# Patient Record
Sex: FEMALE | Race: WHITE | NOT HISPANIC OR LATINO | Employment: FULL TIME | URBAN - METROPOLITAN AREA
[De-identification: names, ages, dates, MRNs, and addresses within clinical notes are randomized per-mention and may not be internally consistent; named-entity substitution may affect disease eponyms.]

---

## 2019-12-25 ENCOUNTER — HOSPITAL ENCOUNTER (EMERGENCY)
Facility: MEDICAL CENTER | Age: 49
End: 2019-12-25
Attending: EMERGENCY MEDICINE
Payer: COMMERCIAL

## 2019-12-25 VITALS
BODY MASS INDEX: 24.86 KG/M2 | TEMPERATURE: 97.9 F | RESPIRATION RATE: 18 BRPM | OXYGEN SATURATION: 95 % | HEART RATE: 74 BPM | HEIGHT: 68 IN | SYSTOLIC BLOOD PRESSURE: 143 MMHG | WEIGHT: 164.02 LBS | DIASTOLIC BLOOD PRESSURE: 108 MMHG

## 2019-12-25 DIAGNOSIS — R51.9 OCCIPITAL HEADACHE: ICD-10-CM

## 2019-12-25 PROCEDURE — 99284 EMERGENCY DEPT VISIT MOD MDM: CPT

## 2019-12-25 PROCEDURE — 700102 HCHG RX REV CODE 250 W/ 637 OVERRIDE(OP): Performed by: EMERGENCY MEDICINE

## 2019-12-25 PROCEDURE — 700111 HCHG RX REV CODE 636 W/ 250 OVERRIDE (IP): Performed by: EMERGENCY MEDICINE

## 2019-12-25 PROCEDURE — 64450 NJX AA&/STRD OTHER PN/BRANCH: CPT

## 2019-12-25 PROCEDURE — A9270 NON-COVERED ITEM OR SERVICE: HCPCS | Performed by: EMERGENCY MEDICINE

## 2019-12-25 RX ORDER — CYCLOBENZAPRINE HCL 10 MG
10 TABLET ORAL 3 TIMES DAILY PRN
Qty: 30 TAB | Refills: 0 | Status: SHIPPED | OUTPATIENT
Start: 2019-12-25 | End: 2021-11-22

## 2019-12-25 RX ORDER — DIAZEPAM 2 MG/1
2 TABLET ORAL ONCE
Status: COMPLETED | OUTPATIENT
Start: 2019-12-25 | End: 2019-12-25

## 2019-12-25 RX ORDER — BUPIVACAINE HYDROCHLORIDE 2.5 MG/ML
10 INJECTION, SOLUTION EPIDURAL; INFILTRATION; INTRACAUDAL ONCE
Status: COMPLETED | OUTPATIENT
Start: 2019-12-25 | End: 2019-12-25

## 2019-12-25 RX ORDER — DEXAMETHASONE 4 MG/1
8 TABLET ORAL ONCE
Status: COMPLETED | OUTPATIENT
Start: 2019-12-25 | End: 2019-12-25

## 2019-12-25 RX ADMIN — DIAZEPAM 2 MG: 2 TABLET ORAL at 20:08

## 2019-12-25 RX ADMIN — BUPIVACAINE HYDROCHLORIDE 10 ML: 2.5 INJECTION, SOLUTION EPIDURAL; INFILTRATION; INTRACAUDAL; PERINEURAL at 19:40

## 2019-12-25 RX ADMIN — DEXAMETHASONE 8 MG: 4 TABLET ORAL at 19:43

## 2019-12-25 ASSESSMENT — LIFESTYLE VARIABLES: DO YOU DRINK ALCOHOL: NO

## 2019-12-26 NOTE — ED NOTES
Pt reports having numbness tingling to hands and feet with anxiety, anti anxiety medication given see eMAR.  Will reassess until symptoms resolve and plan for dispo. Will CTM.

## 2019-12-26 NOTE — ED NOTES
Pt medicated as ordered, received bupivicaine injection by ERP, tolerated well. Report to Isabelle BURT, relinquished care of this patient.

## 2019-12-26 NOTE — ED PROVIDER NOTES
ED Provider Note    CHIEF COMPLAINT  Chief Complaint   Patient presents with   • Neck Pain     Bilateral Neck painx 3 days. Denies trauma. Full ROM   • Headache     x4 days       HPI  Luil Nielsen is a 49 y.o. female who presents for evaluation of several days of spontaneous neck tightness and pain.  Patient denies any trauma she has no significant medical history other than mild hypertension.  She denies any direct injury no whiplash type injury she does not recall sleeping oddly.  She reports tightness over the mastoid process bilaterally symmetric.  No numbness weakness or tingling.  Pain is worse with range of motion it causes a posterior headache as well.  She denies high fevers or anterior neck pain.  She has no history of thyroid disease.  No other symptoms reported    REVIEW OF SYSTEMS  See HPI for further details.  No high fevers numbness weakness or tingling all other systems are negative.     PAST MEDICAL HISTORY  Past Medical History:   Diagnosis Date   • Hypertension        FAMILY HISTORY  Noncontributory    SOCIAL HISTORY  Social History     Socioeconomic History   • Marital status:      Spouse name: Not on file   • Number of children: Not on file   • Years of education: Not on file   • Highest education level: Not on file   Occupational History   • Not on file   Social Needs   • Financial resource strain: Not on file   • Food insecurity:     Worry: Not on file     Inability: Not on file   • Transportation needs:     Medical: Not on file     Non-medical: Not on file   Tobacco Use   • Smoking status: Current Some Day Smoker     Packs/day: 1.00   • Smokeless tobacco: Never Used   Substance and Sexual Activity   • Alcohol use: Yes     Comment: 2/day   • Drug use: No   • Sexual activity: Not on file   Lifestyle   • Physical activity:     Days per week: Not on file     Minutes per session: Not on file   • Stress: Not on file   Relationships   • Social connections:     Talks on phone: Not on  "file     Gets together: Not on file     Attends Anabaptism service: Not on file     Active member of club or organization: Not on file     Attends meetings of clubs or organizations: Not on file     Relationship status: Not on file   • Intimate partner violence:     Fear of current or ex partner: Not on file     Emotionally abused: Not on file     Physically abused: Not on file     Forced sexual activity: Not on file   Other Topics Concern   • Not on file   Social History Narrative   • Not on file       SURGICAL HISTORY  No past surgical history on file.    CURRENT MEDICATIONS  Home Medications     Reviewed by Jacinto Kimball R.N. (Registered Nurse) on 12/25/19 at 1903  Med List Status: Not Addressed   Medication Last Dose Status   ibuprofen (MOTRIN) 200 MG TABS  Active   ibuprofen (MOTRIN) 800 MG TABS  Active                ALLERGIES  No Known Allergies    PHYSICAL EXAM  VITAL SIGNS: /114   Pulse (!) 114   Temp 36.6 °C (97.9 °F)   Resp 18   Ht 1.727 m (5' 8\")   Wt 74.4 kg (164 lb 0.4 oz)   SpO2 97%   BMI 24.94 kg/m²       Constitutional: Patient appears to be uncomfortable  HENT: Normocephalic, Atraumatic, Bilateral external ears normal, Oropharynx moist, No oral exudates, Nose normal.   Eyes: PERRLA, EOMI, Conjunctiva normal, No discharge.   Neck: No midline bony cervical tenderness there is significant paraspinal spasm laterally, symmetric no overlying skin changes   cardiovascular: Normal heart rate, Normal rhythm, No murmurs, No rubs, No gallops.   Thorax & Lungs: Normal breath sounds, No respiratory distress, No wheezing, No chest tenderness.   Abdomen: Bowel sounds normal, Soft, No tenderness, No masses, No pulsatile masses.   Skin: Warm, Dry, No erythema, No rash.   Back: No tenderness, No CVA tenderness.   Extremities: Intact distal pulses, No edema, No tenderness, No cyanosis, No clubbing.   Musculoskeletal: Good range of motion in all major joints. No tenderness to palpation or major " deformities noted.   Neurologic: Alert & oriented x 3, Normal motor function, Normal sensory function, No focal deficits noted.   Psychiatric: Anxious      COURSE & MEDICAL DECISION MAKING  Pertinent Labs & Imaging studies reviewed. (See chart for details)  Physician procedure: Occipital lesser nerve block.  Verbal consent was obtained, the posterior neck was prepped 3 times with chlorhexidine.  A total of 5 cc bupivacaine without epinephrine was instilled on the right side a subcutaneous wheal was performed and then deeper injections were performed.  Similar injection pattern was performed on the contralateral other side with an additional 5 cc.  No complications.  Patient experienced moderate relief    Patient presents here with posterior neck pain.  There is no fever no frontal headache.  This is most consistent with likely occipital tension headache.  She was given dose of steroids.  I will send her home on Flexeril    FINAL IMPRESSION  1.  Occipital headache    Electronically signed by: Gen Johnston, 12/25/2019 7:40 PM

## 2019-12-26 NOTE — ED TRIAGE NOTES
Patient ambulatory to the ED with no Acute distress noted  Chief Complaint   Patient presents with   • Neck Pain     Bilateral Neck painx 3 days. Denies trauma. Full ROM   • Headache     x4 days     Reports that she started with a HA ~ 4 days, unsure about any trauma. The last 3x days has been having bilateral neck pain with full ROM. No other Neuro S/Sx.    After triage, patient educated on flow of the ED and placement in Room. Informed patient to contact staff if S/Sx get worse. Patient placed in the main waiting room

## 2021-10-23 ENCOUNTER — APPOINTMENT (OUTPATIENT)
Dept: RADIOLOGY | Facility: MEDICAL CENTER | Age: 51
End: 2021-10-23
Attending: EMERGENCY MEDICINE
Payer: COMMERCIAL

## 2021-10-23 ENCOUNTER — HOSPITAL ENCOUNTER (EMERGENCY)
Facility: MEDICAL CENTER | Age: 51
End: 2021-10-23
Attending: EMERGENCY MEDICINE
Payer: COMMERCIAL

## 2021-10-23 VITALS
SYSTOLIC BLOOD PRESSURE: 124 MMHG | HEART RATE: 77 BPM | DIASTOLIC BLOOD PRESSURE: 88 MMHG | BODY MASS INDEX: 26.52 KG/M2 | TEMPERATURE: 96.8 F | OXYGEN SATURATION: 95 % | WEIGHT: 175 LBS | RESPIRATION RATE: 19 BRPM | HEIGHT: 68 IN

## 2021-10-23 DIAGNOSIS — S09.90XA CLOSED HEAD INJURY, INITIAL ENCOUNTER: ICD-10-CM

## 2021-10-23 DIAGNOSIS — S02.2XXA CLOSED FRACTURE OF NASAL BONE, INITIAL ENCOUNTER: ICD-10-CM

## 2021-10-23 PROCEDURE — 70486 CT MAXILLOFACIAL W/O DYE: CPT

## 2021-10-23 PROCEDURE — 99284 EMERGENCY DEPT VISIT MOD MDM: CPT

## 2021-10-23 PROCEDURE — 72125 CT NECK SPINE W/O DYE: CPT

## 2021-10-23 PROCEDURE — 70450 CT HEAD/BRAIN W/O DYE: CPT

## 2021-10-23 RX ORDER — CEPHALEXIN 500 MG/1
500 CAPSULE ORAL 4 TIMES DAILY
Qty: 28 CAPSULE | Refills: 0 | Status: SHIPPED | OUTPATIENT
Start: 2021-10-23 | End: 2021-10-30
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
## 2021-11-22 ENCOUNTER — OFFICE VISIT (OUTPATIENT)
Dept: URGENT CARE | Facility: CLINIC | Age: 51
End: 2021-11-22
Payer: COMMERCIAL

## 2021-11-22 ENCOUNTER — HOSPITAL ENCOUNTER (EMERGENCY)
Facility: MEDICAL CENTER | Age: 51
End: 2021-11-22
Attending: EMERGENCY MEDICINE
Payer: COMMERCIAL

## 2021-11-22 ENCOUNTER — APPOINTMENT (OUTPATIENT)
Dept: RADIOLOGY | Facility: MEDICAL CENTER | Age: 51
End: 2021-11-22
Attending: EMERGENCY MEDICINE
Payer: COMMERCIAL

## 2021-11-22 VITALS
DIASTOLIC BLOOD PRESSURE: 128 MMHG | BODY MASS INDEX: 24.86 KG/M2 | SYSTOLIC BLOOD PRESSURE: 166 MMHG | RESPIRATION RATE: 18 BRPM | OXYGEN SATURATION: 94 % | HEART RATE: 95 BPM | HEIGHT: 68 IN | TEMPERATURE: 96.9 F | WEIGHT: 164 LBS

## 2021-11-22 VITALS
RESPIRATION RATE: 18 BRPM | SYSTOLIC BLOOD PRESSURE: 168 MMHG | WEIGHT: 155.42 LBS | HEIGHT: 68 IN | TEMPERATURE: 98.4 F | OXYGEN SATURATION: 97 % | BODY MASS INDEX: 23.56 KG/M2 | DIASTOLIC BLOOD PRESSURE: 115 MMHG | HEART RATE: 81 BPM

## 2021-11-22 DIAGNOSIS — I10 ASYMPTOMATIC HYPERTENSION: ICD-10-CM

## 2021-11-22 DIAGNOSIS — R94.31 ABNORMAL ECG: ICD-10-CM

## 2021-11-22 DIAGNOSIS — R53.83 OTHER FATIGUE: ICD-10-CM

## 2021-11-22 DIAGNOSIS — R53.83 FATIGUE, UNSPECIFIED TYPE: ICD-10-CM

## 2021-11-22 LAB
ALBUMIN SERPL BCP-MCNC: 4.6 G/DL (ref 3.2–4.9)
ALBUMIN/GLOB SERPL: 2 G/DL
ALP SERPL-CCNC: 88 U/L (ref 30–99)
ALT SERPL-CCNC: 27 U/L (ref 2–50)
ANION GAP SERPL CALC-SCNC: 10 MMOL/L (ref 7–16)
AST SERPL-CCNC: 23 U/L (ref 12–45)
BASOPHILS # BLD AUTO: 0.5 % (ref 0–1.8)
BASOPHILS # BLD: 0.04 K/UL (ref 0–0.12)
BILIRUB SERPL-MCNC: 0.5 MG/DL (ref 0.1–1.5)
BUN SERPL-MCNC: 8 MG/DL (ref 8–22)
CALCIUM SERPL-MCNC: 9.9 MG/DL (ref 8.5–10.5)
CHLORIDE SERPL-SCNC: 105 MMOL/L (ref 96–112)
CO2 SERPL-SCNC: 23 MMOL/L (ref 20–33)
CREAT SERPL-MCNC: 0.61 MG/DL (ref 0.5–1.4)
EKG IMPRESSION: NORMAL
EOSINOPHIL # BLD AUTO: 0.07 K/UL (ref 0–0.51)
EOSINOPHIL NFR BLD: 1 % (ref 0–6.9)
ERYTHROCYTE [DISTWIDTH] IN BLOOD BY AUTOMATED COUNT: 45 FL (ref 35.9–50)
GLOBULIN SER CALC-MCNC: 2.3 G/DL (ref 1.9–3.5)
GLUCOSE BLD-MCNC: 122 MG/DL (ref 70–100)
GLUCOSE SERPL-MCNC: 117 MG/DL (ref 65–99)
HCT VFR BLD AUTO: 45.2 % (ref 37–47)
HGB BLD-MCNC: 15.8 G/DL (ref 12–16)
IMM GRANULOCYTES # BLD AUTO: 0.02 K/UL (ref 0–0.11)
IMM GRANULOCYTES NFR BLD AUTO: 0.3 % (ref 0–0.9)
LYMPHOCYTES # BLD AUTO: 1.34 K/UL (ref 1–4.8)
LYMPHOCYTES NFR BLD: 18.4 % (ref 22–41)
MCH RBC QN AUTO: 32.8 PG (ref 27–33)
MCHC RBC AUTO-ENTMCNC: 35 G/DL (ref 33.6–35)
MCV RBC AUTO: 94 FL (ref 81.4–97.8)
MONOCYTES # BLD AUTO: 0.46 K/UL (ref 0–0.85)
MONOCYTES NFR BLD AUTO: 6.3 % (ref 0–13.4)
NEUTROPHILS # BLD AUTO: 5.35 K/UL (ref 2–7.15)
NEUTROPHILS NFR BLD: 73.5 % (ref 44–72)
NRBC # BLD AUTO: 0 K/UL
NRBC BLD-RTO: 0 /100 WBC
NT-PROBNP SERPL IA-MCNC: 105 PG/ML (ref 0–125)
PLATELET # BLD AUTO: 285 K/UL (ref 164–446)
PMV BLD AUTO: 9.9 FL (ref 9–12.9)
POTASSIUM SERPL-SCNC: 4.2 MMOL/L (ref 3.6–5.5)
PROT SERPL-MCNC: 6.9 G/DL (ref 6–8.2)
RBC # BLD AUTO: 4.81 M/UL (ref 4.2–5.4)
SODIUM SERPL-SCNC: 138 MMOL/L (ref 135–145)
TROPONIN T SERPL-MCNC: <6 NG/L (ref 6–19)
TSH SERPL DL<=0.005 MIU/L-ACNC: 1.76 UIU/ML (ref 0.38–5.33)
VIT B12 SERPL-MCNC: 680 PG/ML (ref 211–911)
WBC # BLD AUTO: 7.3 K/UL (ref 4.8–10.8)

## 2021-11-22 PROCEDURE — 85025 COMPLETE CBC W/AUTO DIFF WBC: CPT

## 2021-11-22 PROCEDURE — 82607 VITAMIN B-12: CPT

## 2021-11-22 PROCEDURE — 93000 ELECTROCARDIOGRAM COMPLETE: CPT | Performed by: NURSE PRACTITIONER

## 2021-11-22 PROCEDURE — 82962 GLUCOSE BLOOD TEST: CPT | Performed by: NURSE PRACTITIONER

## 2021-11-22 PROCEDURE — 84443 ASSAY THYROID STIM HORMONE: CPT

## 2021-11-22 PROCEDURE — 99204 OFFICE O/P NEW MOD 45 MIN: CPT | Performed by: NURSE PRACTITIONER

## 2021-11-22 PROCEDURE — 80053 COMPREHEN METABOLIC PANEL: CPT

## 2021-11-22 PROCEDURE — 83880 ASSAY OF NATRIURETIC PEPTIDE: CPT

## 2021-11-22 PROCEDURE — 84484 ASSAY OF TROPONIN QUANT: CPT

## 2021-11-22 PROCEDURE — 93005 ELECTROCARDIOGRAM TRACING: CPT

## 2021-11-22 PROCEDURE — 93005 ELECTROCARDIOGRAM TRACING: CPT | Performed by: EMERGENCY MEDICINE

## 2021-11-22 PROCEDURE — 99283 EMERGENCY DEPT VISIT LOW MDM: CPT

## 2021-11-22 PROCEDURE — 71045 X-RAY EXAM CHEST 1 VIEW: CPT

## 2021-11-22 RX ORDER — RAMIPRIL 5 MG/1
5 CAPSULE ORAL DAILY
Qty: 30 CAPSULE | Refills: 11 | Status: SHIPPED | OUTPATIENT
Start: 2021-11-22
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
## 2021-11-22 NOTE — ED TRIAGE NOTES
"Chief Complaint   Patient presents with   • Fatigue     x48 hours   • Nausea   • Sent from Urgent Care     Abnormal EKG     BP (!) 189/132   Pulse 87   Temp 37.2 °C (99 °F) (Temporal)   Resp 14   Ht 1.727 m (5' 8\")   Wt 70.5 kg (155 lb 6.8 oz)   LMP 05/29/2015   SpO2 95%   BMI 23.63 kg/m²     Pt ambulated into triage, mask in place. EKG completed. Denies CP, SOB. NAD, encouraged to return to the triage nurse or tech with any new complaints or symptoms.  "

## 2021-11-22 NOTE — ED PROVIDER NOTES
ED Provider Note    ED Provider Note    Primary care provider: Perla Ness D.O.  Means of arrival: Walk-in  History obtained from: Patient    CHIEF COMPLAINT  Chief Complaint   Patient presents with   • Fatigue     x48 hours   • Nausea   • Sent from Urgent Care     Abnormal EKG     Seen at 1:51 PM.   HPI  Luli Nielsen is a 51 y.o. female who presents to the Emergency Department for fatigue.  The patient was seen in our emergency department 3 weeks ago after having what appears to be a orthostatic syncopal event.  She underwent a head CT.  Since this time she has had increasing fatigue.  She states that she sleeps throughout the night, when she wakes up she still does not feel rested.  She notes complete lack of energy.  She also feels a buzzing sensation throughout her body at times.    She denies any fevers, chills, night sweats, weight loss, headache, visual changes, neck pain, chest pain, cough, shortness of breath, dyspnea on exertion, orthopnea, abdominal pain, nausea, vomiting, diarrhea, dysuria, rash, impaired immunity or history of diabetes.    She does smoke cigarettes and drinks between 2 to 4 glasses of wine every evening.  She is unaware of any history of FARRUKH but does snore per her .  She has not seen a primary care physician the past several years.  She does not take any medications.    REVIEW OF SYSTEMS  See HPI,   Remainder of ROS negative.     PAST MEDICAL HISTORY   has a past medical history of Hypertension.    SURGICAL HISTORY  patient denies any surgical history    SOCIAL HISTORY  Social History     Tobacco Use   • Smoking status: Current Every Day Smoker     Types: Cigarettes   • Smokeless tobacco: Never Used   Vaping Use   • Vaping Use: Never used   Substance Use Topics   • Alcohol use: Yes     Comment: daily - wine   • Drug use: No      Social History     Substance and Sexual Activity   Drug Use No       FAMILY HISTORY  History reviewed. No pertinent family  "history.    CURRENT MEDICATIONS  Reviewed.  See Encounter Summary.     ALLERGIES  No Known Allergies    PHYSICAL EXAM  VITAL SIGNS: BP (!) 168/115   Pulse 81   Temp 36.9 °C (98.4 °F) (Temporal)   Resp 18   Ht 1.727 m (5' 8\")   Wt 70.5 kg (155 lb 6.8 oz)   LMP 05/29/2015   SpO2 97%   BMI 23.63 kg/m²   Constitutional: Awake, alert in no apparent distress.  HENT: Normocephalic, Bilateral external ears normal. Nose normal.   Eyes: Conjunctiva normal, non-icteric, EOMI.    Thorax & Lungs: Easy unlabored respirations, Clear to ascultation bilaterally.  Cardiovascular: Regular rate, Regular rhythm, No murmurs, rubs or gallops. Bilateral pulses symmetrical.   Abdomen:  Soft, nontender, nondistended, normal active bowel sounds.   :    Skin: Visualized skin is  Dry, No erythema, No rash.   Musculoskeletal:   No cyanosis, clubbing or edema. No leg asymmetry.   Neurologic: Alert, Grossly non-focal.   Psychiatric: Normal affect, Normal mood  Lymphatic:  No cervical LAD    EKG   12 lead Interpreted by me  Rhythm:  Normal sinus rhythm   Rate: 70  Axis: normal  Ectopy: none  Conduction: normal  ST Segments: no acute change  T Waves: no acute change  Clinical Impression: Normal EKG without acute changes, direct comparison made to the one from the urgent care earlier today, no significant change.  There was some mild nonspecific T wave flattening on the lateral leads on the earlier EKG.    RADIOLOGY  DX-CHEST-PORTABLE (1 VIEW)   Final Result      1.  There is no acute cardiopulmonary process.            COURSE & MEDICAL DECISION MAKING  Pertinent Labs & Imaging studies reviewed. (See chart for details)    Differential diagnoses include but are not limited to: Hypertensive urgency, CKD, FARRUKH, B12 deficiency, malignancy, hypothyroidism    1:51 PM - Medical record reviewed, in frequent ER visits that the patient was evaluated a month ago for headache, CT head negative.  She was persistently hypertensive on her last visit as " well.    Decision Making:  This is a pleasant 51 y.o. year old female who presents with fatigue for the past several weeks.  Differential as above.  Laboratory evaluation is unremarkable today.  The patient does not have any sign of endorgan dysfunction, she does not have renal insufficiency, B12 levels within normal limits, cell lines do not suggest malignancy, chest x-ray normal, thyroid levels normal as well.    I do not have an explanation for the patient's fatigue but she does drink between 2 and 4 glasses of wine, so this possibly can be causing her fatigue and poor REM sleep, and other consideration would be FARRUKH.  This should be handled through her primary care physician.  I recommend she contact her insurance company to get a list of in network providers in the area.  In the interim I will start her back on her ACE inhibitor.    Discharge Medications:  Discharge Medication List as of 11/22/2021  4:42 PM      START taking these medications    Details   ramipril (ALTACE) 5 MG Cap Take 1 Capsule by mouth every day., Disp-30 Capsule, R-11, Normal             The patient was discharged home (see d/c instructions) was told to return immediately for any signs or symptoms listed, or any worsening at all.  The patient verbally agreed to the discharge precautions and follow-up plan which is documented in EPIC.        FINAL IMPRESSION  1. Asymptomatic hypertension    2. Other fatigue

## 2021-11-23 NOTE — ED NOTES
Pt ready for discharge.  Instructions provided & px called in.  Pt verbalized understanding.  Leaves ER ambulatory, steady gait, no distress.

## 2024-10-10 ENCOUNTER — HOSPITAL ENCOUNTER (OUTPATIENT)
Dept: RADIOLOGY | Facility: MEDICAL CENTER | Age: 54
End: 2024-10-10
Payer: COMMERCIAL

## 2024-10-18 ENCOUNTER — HOSPITAL ENCOUNTER (OUTPATIENT)
Dept: RADIOLOGY | Facility: MEDICAL CENTER | Age: 54
End: 2024-10-18
Attending: FAMILY MEDICINE
Payer: COMMERCIAL

## 2024-10-18 DIAGNOSIS — Z12.31 ENCOUNTER FOR SCREENING MAMMOGRAM FOR MALIGNANT NEOPLASM OF BREAST: ICD-10-CM

## 2024-10-18 PROCEDURE — 77067 SCR MAMMO BI INCL CAD: CPT

## 2024-10-22 ENCOUNTER — HOSPITAL ENCOUNTER (OUTPATIENT)
Dept: RADIOLOGY | Facility: MEDICAL CENTER | Age: 54
End: 2024-10-22
Payer: COMMERCIAL

## 2024-11-21 ENCOUNTER — HOSPITAL ENCOUNTER (OUTPATIENT)
Dept: RADIOLOGY | Facility: MEDICAL CENTER | Age: 54
End: 2024-11-21
Attending: FAMILY MEDICINE
Payer: COMMERCIAL

## 2024-11-21 DIAGNOSIS — R92.8 ABNORMAL MAMMOGRAM: ICD-10-CM

## 2024-11-21 PROCEDURE — 77065 DX MAMMO INCL CAD UNI: CPT | Mod: LT

## 2024-11-25 ENCOUNTER — HOSPITAL ENCOUNTER (OUTPATIENT)
Dept: RADIOLOGY | Facility: MEDICAL CENTER | Age: 54
End: 2024-11-25
Attending: FAMILY MEDICINE
Payer: COMMERCIAL

## 2024-11-25 DIAGNOSIS — R92.8 ABNORMAL FINDINGS ON DIAGNOSTIC IMAGING OF BREAST: ICD-10-CM

## 2024-11-25 LAB — PATHOLOGY CONSULT NOTE: NORMAL

## 2024-11-25 PROCEDURE — 77065 DX MAMMO INCL CAD UNI: CPT | Mod: LT

## 2024-11-25 PROCEDURE — 88305 TISSUE EXAM BY PATHOLOGIST: CPT

## 2024-11-26 ENCOUNTER — TELEPHONE (OUTPATIENT)
Dept: RADIOLOGY | Facility: MEDICAL CENTER | Age: 54
End: 2024-11-26
Payer: COMMERCIAL

## 2024-11-27 ENCOUNTER — TELEPHONE (OUTPATIENT)
Dept: RADIOLOGY | Facility: MEDICAL CENTER | Age: 54
End: 2024-11-27
Payer: COMMERCIAL

## 2025-01-05 ENCOUNTER — PHARMACY VISIT (OUTPATIENT)
Dept: PHARMACY | Facility: MEDICAL CENTER | Age: 55
End: 2025-01-05
Payer: COMMERCIAL

## 2025-01-05 ENCOUNTER — HOSPITAL ENCOUNTER (EMERGENCY)
Facility: MEDICAL CENTER | Age: 55
End: 2025-01-05
Attending: STUDENT IN AN ORGANIZED HEALTH CARE EDUCATION/TRAINING PROGRAM
Payer: COMMERCIAL

## 2025-01-05 ENCOUNTER — APPOINTMENT (OUTPATIENT)
Dept: RADIOLOGY | Facility: MEDICAL CENTER | Age: 55
End: 2025-01-05
Attending: STUDENT IN AN ORGANIZED HEALTH CARE EDUCATION/TRAINING PROGRAM
Payer: COMMERCIAL

## 2025-01-05 VITALS
WEIGHT: 153.22 LBS | TEMPERATURE: 97.2 F | DIASTOLIC BLOOD PRESSURE: 81 MMHG | SYSTOLIC BLOOD PRESSURE: 114 MMHG | HEIGHT: 68 IN | BODY MASS INDEX: 23.22 KG/M2 | OXYGEN SATURATION: 94 % | HEART RATE: 72 BPM | RESPIRATION RATE: 20 BRPM

## 2025-01-05 DIAGNOSIS — S20.211A CONTUSION OF RIGHT CHEST WALL, INITIAL ENCOUNTER: Primary | ICD-10-CM

## 2025-01-05 LAB
ALBUMIN SERPL BCP-MCNC: 4.3 G/DL (ref 3.2–4.9)
ALBUMIN/GLOB SERPL: 1.7 G/DL
ALP SERPL-CCNC: 85 U/L (ref 30–99)
ALT SERPL-CCNC: 24 U/L (ref 2–50)
ANION GAP SERPL CALC-SCNC: 14 MMOL/L (ref 7–16)
AST SERPL-CCNC: 37 U/L (ref 12–45)
BASOPHILS # BLD AUTO: 1.7 % (ref 0–1.8)
BASOPHILS # BLD: 0.09 K/UL (ref 0–0.12)
BILIRUB SERPL-MCNC: 0.3 MG/DL (ref 0.1–1.5)
BUN SERPL-MCNC: 4 MG/DL (ref 8–22)
CALCIUM ALBUM COR SERPL-MCNC: 9.1 MG/DL (ref 8.5–10.5)
CALCIUM SERPL-MCNC: 9.3 MG/DL (ref 8.5–10.5)
CHLORIDE SERPL-SCNC: 103 MMOL/L (ref 96–112)
CO2 SERPL-SCNC: 21 MMOL/L (ref 20–33)
CREAT SERPL-MCNC: 0.55 MG/DL (ref 0.5–1.4)
EKG IMPRESSION: NORMAL
EOSINOPHIL # BLD AUTO: 0.13 K/UL (ref 0–0.51)
EOSINOPHIL NFR BLD: 2.5 % (ref 0–6.9)
ERYTHROCYTE [DISTWIDTH] IN BLOOD BY AUTOMATED COUNT: 44.5 FL (ref 35.9–50)
GFR SERPLBLD CREATININE-BSD FMLA CKD-EPI: 109 ML/MIN/1.73 M 2
GLOBULIN SER CALC-MCNC: 2.6 G/DL (ref 1.9–3.5)
GLUCOSE SERPL-MCNC: 103 MG/DL (ref 65–99)
HCG SERPL QL: NEGATIVE
HCT VFR BLD AUTO: 46.6 % (ref 37–47)
HGB BLD-MCNC: 15.9 G/DL (ref 12–16)
IMM GRANULOCYTES # BLD AUTO: 0.01 K/UL (ref 0–0.11)
IMM GRANULOCYTES NFR BLD AUTO: 0.2 % (ref 0–0.9)
LYMPHOCYTES # BLD AUTO: 2.53 K/UL (ref 1–4.8)
LYMPHOCYTES NFR BLD: 47.8 % (ref 22–41)
MCH RBC QN AUTO: 33.1 PG (ref 27–33)
MCHC RBC AUTO-ENTMCNC: 34.1 G/DL (ref 32.2–35.5)
MCV RBC AUTO: 97.1 FL (ref 81.4–97.8)
MONOCYTES # BLD AUTO: 0.34 K/UL (ref 0–0.85)
MONOCYTES NFR BLD AUTO: 6.4 % (ref 0–13.4)
NEUTROPHILS # BLD AUTO: 2.19 K/UL (ref 1.82–7.42)
NEUTROPHILS NFR BLD: 41.4 % (ref 44–72)
NRBC # BLD AUTO: 0 K/UL
NRBC BLD-RTO: 0 /100 WBC (ref 0–0.2)
NT-PROBNP SERPL IA-MCNC: 91 PG/ML (ref 0–125)
PLATELET # BLD AUTO: 277 K/UL (ref 164–446)
PMV BLD AUTO: 10.1 FL (ref 9–12.9)
POTASSIUM SERPL-SCNC: 3.6 MMOL/L (ref 3.6–5.5)
PROT SERPL-MCNC: 6.9 G/DL (ref 6–8.2)
RBC # BLD AUTO: 4.8 M/UL (ref 4.2–5.4)
SODIUM SERPL-SCNC: 138 MMOL/L (ref 135–145)
TROPONIN T SERPL-MCNC: <6 NG/L (ref 6–19)
WBC # BLD AUTO: 5.3 K/UL (ref 4.8–10.8)

## 2025-01-05 PROCEDURE — 84703 CHORIONIC GONADOTROPIN ASSAY: CPT

## 2025-01-05 PROCEDURE — 80053 COMPREHEN METABOLIC PANEL: CPT

## 2025-01-05 PROCEDURE — 99284 EMERGENCY DEPT VISIT MOD MDM: CPT

## 2025-01-05 PROCEDURE — 93005 ELECTROCARDIOGRAM TRACING: CPT | Mod: TC

## 2025-01-05 PROCEDURE — 36415 COLL VENOUS BLD VENIPUNCTURE: CPT

## 2025-01-05 PROCEDURE — 71045 X-RAY EXAM CHEST 1 VIEW: CPT

## 2025-01-05 PROCEDURE — RXMED WILLOW AMBULATORY MEDICATION CHARGE: Performed by: STUDENT IN AN ORGANIZED HEALTH CARE EDUCATION/TRAINING PROGRAM

## 2025-01-05 PROCEDURE — 83880 ASSAY OF NATRIURETIC PEPTIDE: CPT

## 2025-01-05 PROCEDURE — 85025 COMPLETE CBC W/AUTO DIFF WBC: CPT

## 2025-01-05 PROCEDURE — 84484 ASSAY OF TROPONIN QUANT: CPT

## 2025-01-05 PROCEDURE — 93005 ELECTROCARDIOGRAM TRACING: CPT | Mod: TC | Performed by: STUDENT IN AN ORGANIZED HEALTH CARE EDUCATION/TRAINING PROGRAM

## 2025-01-05 RX ORDER — IBUPROFEN 400 MG/1
400 TABLET, FILM COATED ORAL EVERY 6 HOURS PRN
Qty: 30 TABLET | Refills: 0 | Status: SHIPPED | OUTPATIENT
Start: 2025-01-05

## 2025-01-05 RX ORDER — LIDOCAINE 4 G/G
1 PATCH TOPICAL EVERY 24 HOURS
Qty: 10 PATCH | Refills: 0 | Status: SHIPPED | OUTPATIENT
Start: 2025-01-05

## 2025-01-05 RX ORDER — ACETAMINOPHEN 325 MG/1
650 TABLET ORAL EVERY 6 HOURS PRN
Qty: 30 TABLET | Refills: 0 | Status: SHIPPED | OUTPATIENT
Start: 2025-01-05

## 2025-01-05 ASSESSMENT — HEART SCORE
RISK FACTORS: 1-2 RISK FACTORS
AGE: 45-64
TROPONIN: LESS THAN OR EQUAL TO NORMAL LIMIT
ECG: NORMAL
HISTORY: SLIGHTLY SUSPICIOUS
HEART SCORE: 2

## 2025-01-05 NOTE — ED TRIAGE NOTES
Chief Complaint   Patient presents with    Chest Pain     Central chest pain since 3 days  Pt said it hurts when she breath    Pt said she may have trauma in her chest, but she is not sure     Pain:  6/10  Ambulatory:  Yes  Alert and Oriented: x 4  Oxygen Treatment: No    Pt came in to triage for the above complaints.     Pt is speaking in full sentences, follows commands and responds appropriately to questions.     Respirations are even and unlabored.    Pt placed in lobby. Pt educated on triage process.     Pt encouraged to inform staff for any changes in condition or if needs help while waiting to be room in.    Vitals:    01/05/25 0055   BP: (!) 147/121   Pulse: 89   Resp: 16   Temp: 36.4 °C (97.5 °F)   SpO2: 96%

## 2025-01-05 NOTE — ED PROVIDER NOTES
ER Provider Note    Scribed for Jakub Victoria M.d. by Daniel Jesus. 1/5/2025  2:08 AM    Primary Care Provider: Perla Ness D.O.    CHIEF COMPLAINT   Chief Complaint   Patient presents with    Chest Pain     Central chest pain since 3 days  Pt said it hurts when she breath    Pt said she may have trauma in her chest, but she is not sure     EXTERNAL RECORDS REVIEWED  Previous ED note where patient was seen in 2021 for fatigue, nausea, had workup that was largely reassuring at that time and discharged home    HPI/ROS  LIMITATION TO HISTORY   Select: : None  OUTSIDE HISTORIAN(S):  None    Luli Nielsen is a 54 y.o. female who presents to the ED for central chest pain onset 3 days ago. The patient explains she was intoxicated and was celebrating the new years earlier this week. She explains she attempted to get off her chair but ultimately fell. She notes she hit her chest against something but is unsure what. The next day she had soreness around her sternum area but denies noticing any bruising. However, this morning she noticed bruising to her right breast. She adds breathing in exacerbates her pain. Patient reports having persistent central chest pain for 48 hours now, prompting her visit to the ED. She denies shortness of breath. Patient has history of high blood pressure. History of smoking.     PAST MEDICAL HISTORY  Past Medical History:   Diagnosis Date    Hypertension        SURGICAL HISTORY  History reviewed. No pertinent surgical history.    FAMILY HISTORY  History reviewed. No pertinent family history.    SOCIAL HISTORY   reports that she has been smoking cigarettes. She has never used smokeless tobacco. She reports current alcohol use. She reports that she does not use drugs.    CURRENT MEDICATIONS  Previous Medications    RAMIPRIL (ALTACE) 5 MG CAP    Take 1 Capsule by mouth every day.       ALLERGIES  Patient has no known allergies.    PHYSICAL EXAM  BP (!) 147/121   Pulse 89   Temp  "36.4 °C (97.5 °F) (Temporal)   Resp 16   Ht 1.727 m (5' 8\")   Wt 69.5 kg (153 lb 3.5 oz)   LMP 05/29/2015   SpO2 96%   BMI 23.30 kg/m²   Constitutional: Well developed, Well nourished, No acute distress, Non-toxic appearance.   HENT: Normocephalic, Atraumatic, Bilateral external ears normal, Oropharynx is clear mucous membranes are moist. No oral exudates or nasal discharge.   Eyes: Pupils are equal round and reactive, EOMI, Conjunctiva normal, No discharge.   Neck: Normal range of motion, No tenderness, Supple, No stridor. No meningismus.  Lymphatic: No lymphadenopathy noted.   Cardiovascular: Regular rate and rhythm without murmur rub or gallop.  Thorax & Lungs: Clear breath sounds bilaterally without wheezes, rhonchi or rales. T tenderness over the right side of the sternum, no crepitus  Abdomen: Soft non-tender non-distended. There is no rebound or guarding. No organomegaly is appreciated. Bowel sounds are normal.  Skin: Bruising over the right breast  Back: No CVA or spinal tenderness.   Extremities: Intact distal pulses, No edema, No tenderness, No cyanosis, No clubbing. Capillary refill is less than 2 seconds.  Musculoskeletal: Good range of motion in all major joints. Tenderness over the right anterior chest wall, or major deformities noted.   Neurologic: Alert & oriented x 3, Normal motor function, Normal sensory function, No focal deficits noted. Reflexes are normal.  Psychiatric: Affect normal, Judgment normal, Mood normal. There is no suicidal ideation or patient reported hallucinations.      DIAGNOSTIC STUDIES    EKG/LABS  Results for orders placed or performed during the hospital encounter of 01/05/25   EKG    Collection Time: 01/05/25  1:15 AM   Result Value Ref Range    Report       [facility]    Test Date:  2025-01-05  Pt Name:    HANNAH GUEVARA              Department: ER  MRN:        [id number]                      Room:  Gender:     Female                   "     Technician: [id number]  :        1970                   Requested By:ER TRIAGE PROTOCOL  Order #:    [id number]                    Reading MD:    Measurements  Intervals                                Axis  Rate:       73                           P:          46  SC:         147                          QRS:        15  QRSD:       93                           T:          17  QT:         439  QTc:        484    Interpretive Statements  Sinus rhythm  Borderline T abnormalities, anterior leads  Compared to ECG 2021 11:02:04  T-wave abnormality now present     CBC with Differential    Collection Time: 25  1:41 AM   Result Value Ref Range    WBC 5.3 4.8 - 10.8 K/uL    RBC 4.80 4.20 - 5.40 M/uL    Hemoglobin 15.9 12.0 - 16.0 g/dL    Hematocrit 46.6 37.0 - 47.0 %    MCV 97.1 81.4 - 97.8 fL    MCH 33.1 (H) 27.0 - 33.0 pg    MCHC 34.1 32.2 - 35.5 g/dL    RDW 44.5 35.9 - 50.0 fL    Platelet Count 277 164 - 446 K/uL    MPV 10.1 9.0 - 12.9 fL    Neutrophils-Polys 41.40 (L) 44.00 - 72.00 %    Lymphocytes 47.80 (H) 22.00 - 41.00 %    Monocytes 6.40 0.00 - 13.40 %    Eosinophils 2.50 0.00 - 6.90 %    Basophils 1.70 0.00 - 1.80 %    Immature Granulocytes 0.20 0.00 - 0.90 %    Nucleated RBC 0.00 0.00 - 0.20 /100 WBC    Neutrophils (Absolute) 2.19 1.82 - 7.42 K/uL    Lymphs (Absolute) 2.53 1.00 - 4.80 K/uL    Monos (Absolute) 0.34 0.00 - 0.85 K/uL    Eos (Absolute) 0.13 0.00 - 0.51 K/uL    Baso (Absolute) 0.09 0.00 - 0.12 K/uL    Immature Granulocytes (abs) 0.01 0.00 - 0.11 K/uL    NRBC (Absolute) 0.00 K/uL   Complete Metabolic Panel (CMP)    Collection Time: 25  1:41 AM   Result Value Ref Range    Sodium 138 135 - 145 mmol/L    Potassium 3.6 3.6 - 5.5 mmol/L    Chloride 103 96 - 112 mmol/L    Co2 21 20 - 33 mmol/L    Anion Gap 14.0 7.0 - 16.0    Glucose 103 (H) 65 - 99 mg/dL    Bun 4 (L) 8 - 22 mg/dL    Creatinine 0.55 0.50 - 1.40 mg/dL    Calcium 9.3 8.5 - 10.5 mg/dL    Correct Calcium 9.1 8.5 - 10.5 mg/dL     AST(SGOT) 37 12 - 45 U/L    ALT(SGPT) 24 2 - 50 U/L    Alkaline Phosphatase 85 30 - 99 U/L    Total Bilirubin 0.3 0.1 - 1.5 mg/dL    Albumin 4.3 3.2 - 4.9 g/dL    Total Protein 6.9 6.0 - 8.2 g/dL    Globulin 2.6 1.9 - 3.5 g/dL    A-G Ratio 1.7 g/dL   proBrain Natriuretic Peptide, NT (BNP_    Collection Time: 01/05/25  1:41 AM   Result Value Ref Range    NT-proBNP 91 0 - 125 pg/mL   Troponins NOW    Collection Time: 01/05/25  1:41 AM   Result Value Ref Range    Troponin T <6 6 - 19 ng/L   HCG Qual Serum    Collection Time: 01/05/25  1:41 AM   Result Value Ref Range    Beta-Hcg Qualitative Serum Negative Negative   ESTIMATED GFR    Collection Time: 01/05/25  1:41 AM   Result Value Ref Range    GFR (CKD-EPI) 109 >60 mL/min/1.73 m 2     I have independently interpreted this EKG.  No evidence of any ischemic changes.    RADIOLOGY/PROCEDURES   The attending emergency physician has independently interpreted the diagnostic imaging associated with this visit and am waiting the final reading from the radiologist.   My preliminary interpretation is a follows: No pneumothorax, displaced rib fractures, sternal fracture.    Radiologist interpretation:  DX-CHEST-PORTABLE (1 VIEW)   Final Result         1. No acute cardiopulmonary abnormalities are identified.          COURSE & MEDICAL DECISION MAKING     ASSESSMENT, COURSE AND PLAN  Care Narrative: Patient was seen and evaluated at bedside. They present for central chest pain after having a fall three days ago.  On exam patient has bruising over the right side of her chest and her right breast.  Patient is not having any respiratory distress, is not hypoxic or tachycardic.  Patient concerned about underlying injury.  I will order eGFR, CBC with diff, CMP, proBrain Natriuretic Peptide, Troponins Now, HCG Qual Serum, EKG, and DX-Chest-Portable to evaluate. Patient was agreeable to this plan of care.     KG does not show any ischemic changes.  Troponin normal.  Metabolic panel  largely normal, labs largely normal.  X-ray does not show any evidence of any displaced rib fracture, focal consolidation or pneumothorax.  Differential diagnosis considered.  Suspect likely chest wall hematoma versus nondisplaced rib fracture.  Patient will be provided with incentive spirometer, analgesics.  Doubt myocardial injury, ACS, PE    2:37 AM - Patient was reevaluated at bedside. Discussed lab and radiology results with the patient and informed them they were reassuring. Patient had the opportunity to ask any questions. The plan for discharge was discussed with them and they were told to return for any new or worsening symptoms. She was also informed of the plans for follow up. Patient is understanding and agreeable to the plan for discharge.    HEART Score: 2      ADDITIONAL PROBLEM LIST  None    DISPOSITION AND DISCUSSIONS  I have discussed management of the patient with the following physicians and PAUL's:  None    Discussion of management with other Q or appropriate source(s): None     Escalation of care considered, and ultimately not performed: diagnostic imaging and acute inpatient care management, however at this time, the patient is most appropriate for outpatient management.    Barriers to care at this time, including but not limited to:  None .     Decision tools and prescription drugs considered including, but not limited to: Pain Medications   .    The patient will return for new or worsening symptoms and is stable at the time of discharge.    The patient is referred to a primary physician for blood pressure management, diabetic screening, and for all other preventative health concerns.    DISPOSITION:  Patient will be discharged home in stable condition.    FOLLOW UP:  Perla Ness D.O.  [address] [facility]  [place] [address]  [phone]    Schedule an appointment as soon as possible for a visit         OUTPATIENT MEDICATIONS:  New Prescriptions    ACETAMINOPHEN (TYLENOL) 325 MG TAB     Take 2 Tablets by mouth every 6 hours as needed for Mild Pain.    IBUPROFEN (MOTRIN) 400 MG TAB    Take 1 Tablet by mouth every 6 hours as needed for Moderate Pain.    LIDOCAINE (ASPERFLEX) 4 % PATCH    Place 1 Patch on the skin every 24 hours.      FINAL DIANGOSIS  1. Contusion of right chest wall, initial encounter Acute     Daniel HERNANDEZ (Scribe), am scribing for, and in the presence of, Jakub Victoria M.D..    Electronically signed by: Daniel Jesus (Scribe), 1/5/2025    Jakub HERNANDEZ M.D. personally performed the services described in this documentation, as scribed by Daniel Jesus in my presence, and it is both accurate and complete.     The note accurately reflects work and decisions made by me.  Jakub Victoria M.D.  1/5/2025  6:31 AM

## 2025-01-05 NOTE — DISCHARGE INSTRUCTIONS
You are seen and evaluated the emergency department for your chest pain.  It is likely that you have a chest wall contusion.  There is no evidence of any collapsed lung, significant displaced rib fractures or sternal more fracture.  There is no evidence of heart injury.  I will prescribe you some anti-inflammatories, topical medications and analgesics at home.  Please use the incentive spirometer as listed above.  Please return to the emergency department for any other concerning symptoms.  Otherwise follow-up with your primary care physician as needed